# Patient Record
Sex: MALE
[De-identification: names, ages, dates, MRNs, and addresses within clinical notes are randomized per-mention and may not be internally consistent; named-entity substitution may affect disease eponyms.]

---

## 2023-07-28 PROBLEM — Z00.00 ENCOUNTER FOR PREVENTIVE HEALTH EXAMINATION: Status: ACTIVE | Noted: 2023-07-28

## 2023-07-31 ENCOUNTER — APPOINTMENT (OUTPATIENT)
Dept: OTOLARYNGOLOGY | Facility: CLINIC | Age: 23
End: 2023-07-31
Payer: COMMERCIAL

## 2023-07-31 VITALS
BODY MASS INDEX: 24.5 KG/M2 | SYSTOLIC BLOOD PRESSURE: 139 MMHG | OXYGEN SATURATION: 99 % | TEMPERATURE: 98 F | WEIGHT: 175 LBS | DIASTOLIC BLOOD PRESSURE: 71 MMHG | HEIGHT: 71 IN | HEART RATE: 54 BPM

## 2023-07-31 DIAGNOSIS — H90.71 MIXED CONDUCTIVE AND SENSORINEURAL HEARING LOSS, UNILATERAL, RIGHT EAR, WITH UNRESTRICTED HEARING ON THE CONTRALATERAL SIDE: ICD-10-CM

## 2023-07-31 DIAGNOSIS — H93.11 TINNITUS, RIGHT EAR: ICD-10-CM

## 2023-07-31 DIAGNOSIS — Z78.9 OTHER SPECIFIED HEALTH STATUS: ICD-10-CM

## 2023-07-31 DIAGNOSIS — H91.90 UNSPECIFIED HEARING LOSS, UNSPECIFIED EAR: ICD-10-CM

## 2023-07-31 PROCEDURE — 99204 OFFICE O/P NEW MOD 45 MIN: CPT

## 2023-07-31 PROCEDURE — 92557 COMPREHENSIVE HEARING TEST: CPT

## 2023-07-31 PROCEDURE — 92550 TYMPANOMETRY & REFLEX THRESH: CPT | Mod: 52

## 2023-07-31 NOTE — ASSESSMENT
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 His mother said he had normal mri as a young child when the hl was found incidentally on school audio.

## 2023-07-31 NOTE — HISTORY OF PRESENT ILLNESS
[de-identified] : 24 y/o M here with his mother is presenting with longstanding history of hearing loss in his right ear. He states he has had hearing loss since he was 7. He got a hearing aid when he was 7 years old and stopped wearing it when he was approximately 12-13 years old. He went to an event approximately 1 month ago and was standing next to a speaker. After the event he developed tinnitus in his right ear and it felt blocked for 1 -2 weeks. Ever since he hears an intermittent high-pitched ring which lasts for 20-30 seconds every 3-4 days. Prior to this event he denies tinnitus. He denies dizziness. Shortly after the event he saw an outside ENT and steroids were prescribed but he did not take them. He had a ; no results available. No FH pertinent to cc. Nonsmoker. His mother said at 1 month old he had a UTI and was hospitalized and was treated with IV abx.

## 2023-07-31 NOTE — DATA REVIEWED
[de-identified] :  showed l nl hearing, r mixed hearing loss; WR- right 12%, left- 100% - results reviewed with pt and mother; compared to  i his cell phone from 7 yrs ago- no conductive component and 72% r sds

## 2023-08-10 ENCOUNTER — APPOINTMENT (OUTPATIENT)
Dept: OTOLARYNGOLOGY | Facility: CLINIC | Age: 23
End: 2023-08-10
Payer: COMMERCIAL

## 2023-08-10 VITALS
OXYGEN SATURATION: 100 % | TEMPERATURE: 98 F | WEIGHT: 175 LBS | BODY MASS INDEX: 24.5 KG/M2 | DIASTOLIC BLOOD PRESSURE: 85 MMHG | HEIGHT: 71 IN | HEART RATE: 50 BPM | SYSTOLIC BLOOD PRESSURE: 137 MMHG

## 2023-08-10 DIAGNOSIS — H90.41 SENSORINEURAL HEARING LOSS, UNILATERAL, RIGHT EAR, WITH UNRESTRICTED HEARING ON THE CONTRALATERAL SIDE: ICD-10-CM

## 2023-08-10 DIAGNOSIS — H91.21 SUDDEN IDIOPATHIC HEARING LOSS, RIGHT EAR: ICD-10-CM

## 2023-08-10 PROCEDURE — 99213 OFFICE O/P EST LOW 20 MIN: CPT

## 2023-08-10 PROCEDURE — 92557 COMPREHENSIVE HEARING TEST: CPT

## 2023-08-10 PROCEDURE — 92550 TYMPANOMETRY & REFLEX THRESH: CPT | Mod: 52

## 2023-08-10 NOTE — ASSESSMENT
[FreeTextEntry1] : r sudden hearing loss approximately 1 month ago after being exposed to loud noise discussed risks benefits and alts to po and intratympanic corticosteroids- strongly recommended both and explained potential risks of holding off he declined both discussed hbo but cannot recommend without steroids - he does not want to do this either discussed cros ha - he is interested- brought to audiologist to discuss discussed ci if he does not like cros - he said he would give it thought he said he would follow up when back from college in October 2023

## 2023-08-10 NOTE — DATA REVIEWED
[de-identified] : r asymm snhl profound- confirmed it is snhl, not mixed with new  [de-identified] : CT scan revealed small mucus retention cysts and adenoid hypertrophy -images and report reviewed with pt  MRI revealed prominent l AICA, cavum septum vergae (discussed with radiologist- nl anatomic variant) -images and report reviewed with pt

## 2023-08-10 NOTE — HISTORY OF PRESENT ILLNESS
[de-identified] : 10 day followup visit for this 22 y/o M with longstanding history of hearing loss in his right ear since he was 7 years old. He has had hearing aid in the past. He went to an event approximately 1 month ago and was standing next to a speaker. Subsequently after event he developed tinnitus in his right ear and his ear feels blocked. He denies ear trauma. At last visit he was found to have r mixed hearing loss and r sudden hearing loss. He preferred to hold off on PO steroids. He is here to review MRI and CT scan.